# Patient Record
Sex: FEMALE | Race: WHITE | Employment: PART TIME | URBAN - METROPOLITAN AREA
[De-identification: names, ages, dates, MRNs, and addresses within clinical notes are randomized per-mention and may not be internally consistent; named-entity substitution may affect disease eponyms.]

---

## 2019-11-29 ENCOUNTER — HOSPITAL ENCOUNTER (EMERGENCY)
Age: 24
Discharge: HOME | End: 2019-11-29
Attending: EMERGENCY MEDICINE | Admitting: EMERGENCY MEDICINE
Payer: COMMERCIAL

## 2019-11-29 VITALS
WEIGHT: 214.51 LBS | DIASTOLIC BLOOD PRESSURE: 96 MMHG | HEART RATE: 98 BPM | OXYGEN SATURATION: 100 % | RESPIRATION RATE: 16 BRPM | HEIGHT: 66 IN | BODY MASS INDEX: 34.47 KG/M2 | TEMPERATURE: 98.1 F | SYSTOLIC BLOOD PRESSURE: 138 MMHG

## 2019-11-29 DIAGNOSIS — R10.9 SIDE PAIN: Primary | ICD-10-CM

## 2019-11-29 LAB
ALBUMIN SERPL-MCNC: 3.5 G/DL (ref 3.5–5)
ALBUMIN/GLOB SERPL: 0.9 {RATIO} (ref 1.1–2.2)
ALP SERPL-CCNC: 54 U/L (ref 45–117)
ALT SERPL-CCNC: 23 U/L (ref 12–78)
ANION GAP SERPL CALC-SCNC: 10 MMOL/L (ref 5–15)
APPEARANCE UR: CLEAR
AST SERPL-CCNC: 19 U/L (ref 15–37)
BACTERIA URNS QL MICRO: ABNORMAL /HPF
BASOPHILS # BLD: 0 K/UL (ref 0–0.1)
BASOPHILS NFR BLD: 1 % (ref 0–1)
BILIRUB SERPL-MCNC: 0.3 MG/DL (ref 0.2–1)
BILIRUB UR QL: NEGATIVE
BUN SERPL-MCNC: 9 MG/DL (ref 6–20)
BUN/CREAT SERPL: 11 (ref 12–20)
CALCIUM SERPL-MCNC: 9.2 MG/DL (ref 8.5–10.1)
CHLORIDE SERPL-SCNC: 108 MMOL/L (ref 97–108)
CO2 SERPL-SCNC: 24 MMOL/L (ref 21–32)
COLOR UR: ABNORMAL
CREAT SERPL-MCNC: 0.84 MG/DL (ref 0.55–1.02)
D DIMER PPP FEU-MCNC: 0.25 MG/L FEU (ref 0–0.65)
DIFFERENTIAL METHOD BLD: ABNORMAL
EOSINOPHIL # BLD: 0.5 K/UL (ref 0–0.4)
EOSINOPHIL NFR BLD: 6 % (ref 0–7)
EPITH CASTS URNS QL MICRO: ABNORMAL /LPF
ERYTHROCYTE [DISTWIDTH] IN BLOOD BY AUTOMATED COUNT: 13 % (ref 11.5–14.5)
GLOBULIN SER CALC-MCNC: 4 G/DL (ref 2–4)
GLUCOSE SERPL-MCNC: 117 MG/DL (ref 65–100)
GLUCOSE UR STRIP.AUTO-MCNC: NEGATIVE MG/DL
GRAN CASTS URNS QL MICRO: ABNORMAL /LPF
HCG UR QL: NEGATIVE
HCT VFR BLD AUTO: 42.6 % (ref 35–47)
HGB BLD-MCNC: 13.8 G/DL (ref 11.5–16)
HGB UR QL STRIP: NEGATIVE
IMM GRANULOCYTES # BLD AUTO: 0 K/UL (ref 0–0.04)
IMM GRANULOCYTES NFR BLD AUTO: 0 % (ref 0–0.5)
KETONES UR QL STRIP.AUTO: NEGATIVE MG/DL
LEUKOCYTE ESTERASE UR QL STRIP.AUTO: NEGATIVE
LYMPHOCYTES # BLD: 2.9 K/UL (ref 0.8–3.5)
LYMPHOCYTES NFR BLD: 34 % (ref 12–49)
MCH RBC QN AUTO: 28.9 PG (ref 26–34)
MCHC RBC AUTO-ENTMCNC: 32.4 G/DL (ref 30–36.5)
MCV RBC AUTO: 89.3 FL (ref 80–99)
MONOCYTES # BLD: 0.5 K/UL (ref 0–1)
MONOCYTES NFR BLD: 5 % (ref 5–13)
MUCOUS THREADS URNS QL MICRO: ABNORMAL /LPF
NEUTS SEG # BLD: 4.7 K/UL (ref 1.8–8)
NEUTS SEG NFR BLD: 54 % (ref 32–75)
NITRITE UR QL STRIP.AUTO: NEGATIVE
NRBC # BLD: 0 K/UL (ref 0–0.01)
NRBC BLD-RTO: 0 PER 100 WBC
PH UR STRIP: 6 [PH] (ref 5–8)
PLATELET # BLD AUTO: 283 K/UL (ref 150–400)
PMV BLD AUTO: 10.6 FL (ref 8.9–12.9)
POTASSIUM SERPL-SCNC: 3.6 MMOL/L (ref 3.5–5.1)
PROT SERPL-MCNC: 7.5 G/DL (ref 6.4–8.2)
PROT UR STRIP-MCNC: NEGATIVE MG/DL
RBC # BLD AUTO: 4.77 M/UL (ref 3.8–5.2)
RBC #/AREA URNS HPF: ABNORMAL /HPF (ref 0–5)
SODIUM SERPL-SCNC: 142 MMOL/L (ref 136–145)
SP GR UR REFRACTOMETRY: 1.02 (ref 1–1.03)
TROPONIN I SERPL-MCNC: <0.05 NG/ML
UA: UC IF INDICATED,UAUC: ABNORMAL
UROBILINOGEN UR QL STRIP.AUTO: 0.2 EU/DL (ref 0.2–1)
WBC # BLD AUTO: 8.6 K/UL (ref 3.6–11)
WBC URNS QL MICRO: ABNORMAL /HPF (ref 0–4)

## 2019-11-29 PROCEDURE — 85379 FIBRIN DEGRADATION QUANT: CPT

## 2019-11-29 PROCEDURE — 74011250636 HC RX REV CODE- 250/636: Performed by: EMERGENCY MEDICINE

## 2019-11-29 PROCEDURE — 87077 CULTURE AEROBIC IDENTIFY: CPT

## 2019-11-29 PROCEDURE — 84484 ASSAY OF TROPONIN QUANT: CPT

## 2019-11-29 PROCEDURE — 87186 SC STD MICRODIL/AGAR DIL: CPT

## 2019-11-29 PROCEDURE — 99283 EMERGENCY DEPT VISIT LOW MDM: CPT

## 2019-11-29 PROCEDURE — 81025 URINE PREGNANCY TEST: CPT

## 2019-11-29 PROCEDURE — 96374 THER/PROPH/DIAG INJ IV PUSH: CPT

## 2019-11-29 PROCEDURE — 85025 COMPLETE CBC W/AUTO DIFF WBC: CPT

## 2019-11-29 PROCEDURE — 81001 URINALYSIS AUTO W/SCOPE: CPT

## 2019-11-29 PROCEDURE — 87086 URINE CULTURE/COLONY COUNT: CPT

## 2019-11-29 PROCEDURE — 80053 COMPREHEN METABOLIC PANEL: CPT

## 2019-11-29 RX ORDER — KETOROLAC TROMETHAMINE 30 MG/ML
15 INJECTION, SOLUTION INTRAMUSCULAR; INTRAVENOUS
Status: COMPLETED | OUTPATIENT
Start: 2019-11-29 | End: 2019-11-29

## 2019-11-29 RX ADMIN — KETOROLAC TROMETHAMINE 15 MG: 30 INJECTION, SOLUTION INTRAMUSCULAR at 20:10

## 2019-11-30 NOTE — DISCHARGE INSTRUCTIONS

## 2019-11-30 NOTE — ED NOTES
I have reviewed discharge instructions with the patient. The patient verbalized understanding. The patient ambulated out of the department in no distress. VSS.

## 2019-11-30 NOTE — ED NOTES
Pt presents c/o waking up with right side pain and shoulder pain. [facility] and told [facility]up was negative, so she was sent here for further workup.

## 2019-11-30 NOTE — ED PROVIDER NOTES
EMERGENCY DEPARTMENT HISTORY AND PHYSICAL EXAM      Date: 11/29/2019  Patient Name: Cristhian Drake    History of Presenting Illness     Chief Complaint   Patient presents with    Side Pain     right side pain since 0230. reports negative workup at [facility]. sent to ED for further evaluation. History Provided By: Patient    HPI: Cristhian Drake, 30 y.o. female with PMHx significant for nothing, who presents with a chief complaint of right side pain. Patient states she woke up this morning around 2:30 AM with some pain in her right side and right shoulder. She notes that the pain seems to be more pronounced when she takes a deep breath. She denies any associated chest pain, nausea, vomiting, diarrhea, urinary symptoms. She has not tried any medications at home for symptoms. She denies any history of abdominal surgeries. No history of DVT or PE. No recent travel, surgery, hospital stay. She is on a NuvaRing for birth control. [facility] and was sent to the emergency department for additional evaluation    PCP: None    There are no other complaints, changes, or physical findings at this time. Current Outpatient Medications   Medication Sig Dispense Refill    ibuprofen (MOTRIN) 600 mg tablet Take 1 Tab by mouth every eight (8) hours as needed for Pain. 30 Tab 0    oxyCODONE-acetaminophen (PERCOCET) 5-325 mg per tablet Take 1 Tab by mouth every four (4) hours as needed for Pain. Max Daily Amount: 6 Tabs. 12 Tab 0     Past History     Past Medical History:  History reviewed. No pertinent past medical history. Past Surgical History:  History reviewed. No pertinent surgical history. Family History:  History reviewed. No pertinent family history.   Social History:  Social History     Tobacco Use    Smoking status: Never Smoker   Substance Use Topics    Alcohol use: No    Drug use: No     Allergies:  No Known Allergies  Review of Systems   Review of Systems   Constitutional: Negative for chills and fever. HENT: Negative for congestion, rhinorrhea and sore throat. Respiratory: Negative for cough and shortness of breath. Cardiovascular: Negative for chest pain. Gastrointestinal: Negative for abdominal pain, nausea and vomiting.        + right side pain   Genitourinary: Negative for dysuria and urgency. Skin: Negative for rash. Neurological: Negative for dizziness, light-headedness and headaches. All other systems reviewed and are negative. Physical Exam   Physical Exam  Vitals signs and nursing note reviewed. Constitutional:       General: She is not in acute distress. Appearance: She is well-developed. HENT:      Head: Normocephalic and atraumatic. Eyes:      Conjunctiva/sclera: Conjunctivae normal.      Pupils: Pupils are equal, round, and reactive to light. Neck:      Musculoskeletal: Normal range of motion. Cardiovascular:      Rate and Rhythm: Normal rate and regular rhythm. Pulmonary:      Effort: Pulmonary effort is normal. No respiratory distress. Breath sounds: Normal breath sounds. No stridor. Abdominal:      General: There is no distension. Palpations: Abdomen is soft. Tenderness: There is tenderness. There is no right CVA tenderness or left CVA tenderness. Comments: Very mild tenderness in the right upper quadrant with no rebound or guarding   Musculoskeletal: Normal range of motion. Skin:     General: Skin is warm and dry. Neurological:      Mental Status: She is alert and oriented to person, place, and time.        Diagnostic Study Results   Labs -     Recent Results (from the past 12 hour(s))   CBC WITH AUTOMATED DIFF    Collection Time: 11/29/19  8:05 PM   Result Value Ref Range    WBC 8.6 3.6 - 11.0 K/uL    RBC 4.77 3.80 - 5.20 M/uL    HGB 13.8 11.5 - 16.0 g/dL    HCT 42.6 35.0 - 47.0 %    MCV 89.3 80.0 - 99.0 FL    MCH 28.9 26.0 - 34.0 PG    MCHC 32.4 30.0 - 36.5 g/dL    RDW 13.0 11.5 - 14.5 %    PLATELET 438 505 - 853 K/uL    MPV 10.6 8.9 - 12.9 FL    NRBC 0.0 0  WBC    ABSOLUTE NRBC 0.00 0.00 - 0.01 K/uL    NEUTROPHILS 54 32 - 75 %    LYMPHOCYTES 34 12 - 49 %    MONOCYTES 5 5 - 13 %    EOSINOPHILS 6 0 - 7 %    BASOPHILS 1 0 - 1 %    IMMATURE GRANULOCYTES 0 0.0 - 0.5 %    ABS. NEUTROPHILS 4.7 1.8 - 8.0 K/UL    ABS. LYMPHOCYTES 2.9 0.8 - 3.5 K/UL    ABS. MONOCYTES 0.5 0.0 - 1.0 K/UL    ABS. EOSINOPHILS 0.5 (H) 0.0 - 0.4 K/UL    ABS. BASOPHILS 0.0 0.0 - 0.1 K/UL    ABS. IMM. GRANS. 0.0 0.00 - 0.04 K/UL    DF AUTOMATED     METABOLIC PANEL, COMPREHENSIVE    Collection Time: 11/29/19  8:05 PM   Result Value Ref Range    Sodium 142 136 - 145 mmol/L    Potassium 3.6 3.5 - 5.1 mmol/L    Chloride 108 97 - 108 mmol/L    CO2 24 21 - 32 mmol/L    Anion gap 10 5 - 15 mmol/L    Glucose 117 (H) 65 - 100 mg/dL    BUN 9 6 - 20 MG/DL    Creatinine 0.84 0.55 - 1.02 MG/DL    BUN/Creatinine ratio 11 (L) 12 - 20      GFR est AA >60 >60 ml/min/1.73m2    GFR est non-AA >60 >60 ml/min/1.73m2    Calcium 9.2 8.5 - 10.1 MG/DL    Bilirubin, total 0.3 0.2 - 1.0 MG/DL    ALT (SGPT) 23 12 - 78 U/L    AST (SGOT) 19 15 - 37 U/L    Alk.  phosphatase 54 45 - 117 U/L    Protein, total 7.5 6.4 - 8.2 g/dL    Albumin 3.5 3.5 - 5.0 g/dL    Globulin 4.0 2.0 - 4.0 g/dL    A-G Ratio 0.9 (L) 1.1 - 2.2     D DIMER    Collection Time: 11/29/19  8:05 PM   Result Value Ref Range    D-dimer 0.25 0.00 - 0.65 mg/L FEU   TROPONIN I    Collection Time: 11/29/19  8:05 PM   Result Value Ref Range    Troponin-I, Qt. <0.05 <0.05 ng/mL   URINALYSIS W/ REFLEX CULTURE    Collection Time: 11/29/19  8:23 PM   Result Value Ref Range    Color YELLOW/STRAW      Appearance CLEAR CLEAR      Specific gravity 1.016 1.003 - 1.030      pH (UA) 6.0 5.0 - 8.0      Protein NEGATIVE  NEG mg/dL    Glucose NEGATIVE  NEG mg/dL    Ketone NEGATIVE  NEG mg/dL    Bilirubin NEGATIVE  NEG      Blood NEGATIVE  NEG      Urobilinogen 0.2 0.2 - 1.0 EU/dL    Nitrites NEGATIVE NEG      Leukocyte Esterase NEGATIVE  NEG      WBC 0-4 0 - 4 /hpf    RBC 0-5 0 - 5 /hpf    Epithelial cells FEW FEW /lpf    Bacteria 3+ (A) NEG /hpf    UA:UC IF INDICATED URINE CULTURE ORDERED (A) CNI      Mucus TRACE (A) NEG /lpf    Granular cast 0-2 (A) NEG /lpf   HCG URINE, QL. - POC    Collection Time: 11/29/19  8:24 PM   Result Value Ref Range    Pregnancy test,urine (POC) NEGATIVE  NEG         Radiologic Studies -   No orders to display     No results found. Medical Decision Making   I am the first provider for this patient. I reviewed the vital signs, available nursing notes, past medical history, past surgical history, family history and social history. Vital Signs-Reviewed the patient's vital signs. Patient Vitals for the past 12 hrs:   Temp Pulse Resp BP SpO2   11/29/19 2031  98 16 (!) 138/96 100 %   11/29/19 1712 98.1 °F (36.7 °C) 96 16 (!) 137/97 100 %       Pulse Oximetry Analysis - 100% on RA    Records Reviewed: Nursing Notes and Old Medical Records    Provider Notes (Medical Decision Making):   Patient presents with a chief complaint of right side pain. Has only mild tenderness with palpation to the right upper quadrant of her abdomen but no rebound or guarding. On exam, she is well-appearing with stable vital signs. She is not hypoxic on room air and is able to speak in complete sentences without difficulty. Differential includes musculoskeletal pain, cholelithiasis, doubt cholecystitis, doubt PE. However given location of symptoms we will check basic labs, d-dimer, troponin, urinalysis. Given overall benign exam we will hold off on imaging for now unless labs are abnormal.    I was able to review [facility] which patient brought on a disc. Chest x-ray with no focal infiltrate per my read. ED Course:   Initial assessment performed.  The patients presenting problems have been discussed, and they are in agreement with the care plan formulated and outlined with them.  I have encouraged them to ask questions as they arise throughout their visit. Lab work unremarkable. Discussed the option of imaging with the patient, however given normal labs I do not feel is necessary at this time. Patient agrees and states she will treat the pain at home and if things worsen she will return to the emergency department for evaluation. Urine did have bacteria, however no other signs of UTI. Patient again denies any urinary symptoms so we will not treat with antibiotics at this time. Patient is stable for discharge with outpatient follow-up. Return precautions discussed. Critical Care:  none    Disposition:  Discharge Note:    The patient has been re-evaluated and is ready for discharge. Reviewed available results with patient. Counseled patient on diagnosis and care plan. Patient has expressed understanding, and all questions have been answered. Patient agrees with plan and agrees to follow up as recommended, or to return to the ED if their symptoms worsen. Discharge instructions have been provided and explained to the patient, along with reasons to return to the ED. PLAN:  1. Discharge Medication List as of 11/29/2019  9:21 PM        2. Follow-up Information     Follow up With Specialties Details Why Contact Info    your PCP  Schedule an appointment as soon as possible for a visit      [facility] EMERGENCY DEPT Emergency Medicine  As needed, If symptoms worsen [address]  [address] [facility]  [phone]        Return to ED if worse     Diagnosis     Clinical Impression:   1. Side pain        This note will not be viewable in [employer]. Please note that this dictation was completed with [employer], the computer voice recognition software. Quite often unanticipated grammatical, syntax, homophones, and other interpretive errors are inadvertently transcribed by the computer software. Please disregard these errors.   Please excuse any errors that have escaped final proofreading

## 2019-12-02 LAB
BACTERIA SPEC CULT: ABNORMAL
BACTERIA SPEC CULT: ABNORMAL
CC UR VC: ABNORMAL
SERVICE CMNT-IMP: ABNORMAL

## 2019-12-02 NOTE — PROGRESS NOTES
Attempted to call pt to discuss results. Pt evaluated for side pain. Neg leukocytes and nitrites. If pt symptomatic, would treat. Provided callback number.

## 2019-12-02 NOTE — PROGRESS NOTES
Called patient. Pt denies any dysuria, hematuria, urgency, frequency, fever. No further treatment indicated at this point in time.

## 2021-03-18 ENCOUNTER — DOCUMENTATION ONLY (OUTPATIENT)
Dept: NEUROSURGERY | Age: 26
End: 2021-03-18

## 2021-03-18 NOTE — PROGRESS NOTES
Note from patient's visit with ophthalmologist Dr. James Kessler on 3/17/2021 was faxed to our office. In summary, the note reports that the patient is experiencing worsening headaches and new onset pulsatile tinnitus since the fall 2020. There is no disc edema on her ophthalmologic exam.  Her only medication is the NuvaRing. She reports weight fluctuation, with current weight at 225 pounds. Concern is for idiopathic intracranial hypertension with associated pulsatile tinnitus but without papilledema. I will see this patient in clinic as soon as possible. Note will be scanned into media.

## 2021-04-20 ENCOUNTER — OFFICE VISIT (OUTPATIENT)
Dept: NEUROSURGERY | Age: 26
End: 2021-04-20
Payer: COMMERCIAL

## 2021-04-20 VITALS
HEIGHT: 66 IN | HEART RATE: 88 BPM | SYSTOLIC BLOOD PRESSURE: 118 MMHG | BODY MASS INDEX: 36.32 KG/M2 | TEMPERATURE: 97.5 F | DIASTOLIC BLOOD PRESSURE: 78 MMHG | OXYGEN SATURATION: 99 % | WEIGHT: 226 LBS

## 2021-04-20 DIAGNOSIS — H93.A3 PULSATILE TINNITUS OF BOTH EARS: Primary | ICD-10-CM

## 2021-04-20 PROCEDURE — 99214 OFFICE O/P EST MOD 30 MIN: CPT | Performed by: RADIOLOGY

## 2021-04-20 RX ORDER — BISMUTH SUBSALICYLATE 262 MG
1 TABLET,CHEWABLE ORAL DAILY
COMMUNITY

## 2021-04-20 RX ORDER — CALCIUM CARBONATE 300MG(750)
400 TABLET,CHEWABLE ORAL DAILY
COMMUNITY

## 2021-04-20 RX ORDER — ETONOGESTREL AND ETHINYL ESTRADIOL 11.7; 2.7 MG/1; MG/1
INSERT, EXTENDED RELEASE VAGINAL
COMMUNITY

## 2021-04-20 NOTE — PROGRESS NOTES
New patient referred by Dr Robbi Howard presenting with Bilateral Pulsatile Tinnitus. Reports pulsing in both ears, headaches and positional dizziness. Reports symptoms started October 2020. Reports migraine/headache 4-5 time per week with frequent pulsing in ears. Headache usually on the top left side of her head. Currently reports due to diet change and taking vitamins her headaches/migraines are 1 episode every 2 weeks and pulsing in ears one episode in past month.

## 2021-04-20 NOTE — PATIENT INSTRUCTIONS
A Healthy Lifestyle: Care Instructions Your Care Instructions A healthy lifestyle can help you feel good, stay at a healthy weight, and have plenty of energy for both work and play. A healthy lifestyle is something you can share with your whole family. A healthy lifestyle also can lower your risk for serious health problems, such as high blood pressure, heart disease, and diabetes. You can follow a few steps listed below to improve your health and the health of your family. Follow-up care is a key part of your treatment and safety. Be sure to make and go to all appointments, and call your doctor if you are having problems. It's also a good idea to know your test results and keep a list of the medicines you take. How can you care for yourself at home? · Do not eat too much sugar, fat, or fast foods. You can still have dessert and treats now and then. The goal is moderation. · Start small to improve your eating habits. Pay attention to portion sizes, drink less juice and soda pop, and eat more fruits and vegetables. ? Eat a healthy amount of food. A 3-ounce serving of meat, for example, is about the size of a deck of cards. Fill the rest of your plate with vegetables and whole grains. ? Limit the amount of soda and sports drinks you have every day. Drink more water when you are thirsty. ? Eat plenty of fruits and vegetables every day. Have an apple or some carrot sticks as an afternoon snack instead of a candy bar. Try to have fruits and/or vegetables at every meal. 
· Make exercise part of your daily routine. You may want to start with simple activities, such as walking, bicycling, or slow swimming. Try to be active 30 to 60 minutes every day. You do not need to do all 30 to 60 minutes all at once. For example, you can exercise 3 times a day for 10 or 20 minutes.  Moderate exercise is safe for most people, but it is always a good idea to talk to your doctor before starting an exercise program. 
· Keep moving. Iveth Reina the lawn, work in the garden, or [employer]. Take the stairs instead of the elevator at work. · If you smoke, quit. People who smoke have an increased risk for heart attack, stroke, cancer, and other lung illnesses. Quitting is hard, but there are ways to boost your chance of quitting tobacco for good. ? Use nicotine gum, patches, or lozenges. ? Ask your doctor about stop-smoking programs and medicines. ? Keep trying. In addition to reducing your risk of diseases in the future, you will notice some benefits soon after you stop using tobacco. If you have shortness of breath or asthma symptoms, they will likely get better within a few weeks after you quit. · Limit how much alcohol you drink. Moderate amounts of alcohol (up to 2 drinks a day for men, 1 drink a day for women) are okay. But drinking too much can lead to liver problems, high blood pressure, and other health problems. Family health If you have a family, there are many things you can do together to improve your health. · Eat meals together as a family as often as possible. · Eat healthy foods. This includes fruits, vegetables, lean meats and dairy, and whole grains. · Include your family in your fitness plan. Most people think of activities such as jogging or tennis as the way to fitness, but there are many ways you and your family can be more active. Anything that makes you breathe hard and gets your heart pumping is exercise. Here are some tips: 
? Walk to do errands or to take your child to school or the bus. 
? Go for a family bike ride after dinner instead of watching TV. Where can you learn more? Go to http://www.gray.com/ Enter H067 in the search box to learn more about \"A Healthy Lifestyle: Care Instructions. \" Current as of: September 23, 2020               Content Version: 12.8 © 2738-8863 [employer].   
Care instructions adapted under license by [employer] (which disclaims liability or warranty for this information). If you have questions about a medical condition or this instruction, always ask your healthcare professional. [address] any warranty or liability for your use of this information.

## 2021-04-20 NOTE — PROGRESS NOTES
Neurointerventional Surgery Clinic Note    Patient: Candida Santos MRN: [id number]  SSN: [id number]    YOB: 1995  Age: 30 y.o. Sex: female      Subjective:      Candida Santos is a 30 y.o. female with headaches and pulsatile tinnitus who is referred by Dr. Zuri Hardy with concern for idiopathic intracranial hypertension. Patient was seen by Dr. Zuri Hardy on 3/17/2021. There was no evidence of papilledema on her exam.    Patient has a history of migraine headaches with visual aura since she was young. The migraines occurred approximately once a month and were in sync with her menstrual cycle. The last migraine headache that she experienced was in May 2020. In around October 2020, patient started experiencing severe headaches. These headaches occurred 4 to 5 times per week and lasted the whole day. The headaches were 7-8 out of 10 in severity and characterized by a stabbing pain at the left top of her head which spread to the back of her left ear. There was no associated visual aura. Now, the headaches only occur once a week, only last 1 hour, and are less severe (approximately 5 out of 10 in severity). She states that they mostly occur in the mornings and when she stretches. Alcohol is a trigger. Ibuprofen helps. Around the same time of the onset of these headaches, patient started experiencing a pulsing sound in her ears. Upon further questioning, she states that it is a \"whooshing\" sound that is synchronous with her pulse. She also states that at times it was synchronous with sounds she was hearing. She hears a sound in both of her ears and it is not more prominent in 1 year versus the other. This pulsatile tinnitus has also become less frequent and less severe since October. She was not currently experiencing the sound during the clinic visit. The sound could not be elicited with head turning, bending forward, or performing Valsalva.   During the clinic visit, she also started hearing a high-pitched sound in her left ear which was not pulsatile. She states that over the course of 2020 prior to October, she had gained weight. Since October patient has made some diet changes and has improved her stress management. She attributes the improvement of the headaches and pulsatile tinnitus to these changes. Patient denies weakness, numbness and tingling, nausea and vomiting, vision changes, and speech changes. She states that when her headache is really bad she can occasionally feel a little off balance. She has occasional dizziness, mostly when working out. She also occasionally experiences tunnel vision while driving in the car. Patient denies tobacco use. No past medical history on file. No past surgical history on file. Family History   Problem Relation Age of Onset    Headache Mother     Migraines Mother     Heart Disease Father      Social History     Tobacco Use    Smoking status: Never Smoker    Smokeless tobacco: Never Used   Substance Use Topics    Alcohol use: Not on file      Current Outpatient Medications   Medication Sig Dispense Refill    ethinyl estradiol-etonogestrel (NuvaRing) 0.12-0.015 mg/24 hr vaginal ring NuvaRing 0.12 mg-0.015 mg/24 hr vaginal   INSERT 1 VAGINALLY FOR 3 WEEKS THEN REMOVE FOR 1 WEEK. THEN REPEAT      Lactobacillus acidophilus (PROBIOTIC PO) Take  by mouth daily.  magnesium oxide 400 mg magnesium tab Take 400 mg by mouth daily.  multivitamin (ONE A DAY) tablet Take 1 Tab by mouth daily.  OTHER Reports OTC vitamin- does not know the name.  ibuprofen (MOTRIN) 600 mg tablet Take 1 Tab by mouth every eight (8) hours as needed for Pain. 30 Tab 0    oxyCODONE-acetaminophen (PERCOCET) 5-325 mg per tablet Take 1 Tab by mouth every four (4) hours as needed for Pain. Max Daily Amount: 6 Tabs. 12 Tab 0        No Known Allergies    Review of Systems:  Pertinent items are noted in the History of Present Illness.     Objective: Vitals:    04/20/21 1025   BP: 118/78   Pulse: 88   Temp: 97.5 °F (36.4 °C)   TempSrc: Temporal   SpO2: 99%   Weight: 226 lb (102.5 kg)   Height: 5' 6\" (1.676 m)        Physical Exam:  GENERAL: alert, cooperative, no distress, appears stated age  EYE: negative  HEAD & NECK: no periauricular bruits or thrills; no carotid bruit  LUNG: clear to auscultation bilaterally  HEART: regular rate and rhythm, S1, S2 normal, no murmur, click, rub or gallop  EXTREMITIES:  extremities normal, atraumatic, no cyanosis or edema    Neurologic Exam:  Mental Status:  Alert and oriented x 4. Appropriate affect, mood and behavior. Language:    Normal fluency, repetition, comprehension and naming. Cranial Nerves:   Pupils equal, round and reactive to light. Visual fields full to confrontation. Extraocular movements intact. Facial sensation intact V1 - V3. Full facial strength, no asymmetry. Hearing intact bilaterally. No dysarthria. Tongue protrudes to midline, palate elevates symmetrically. Shoulder shrug 5/5 bilaterally. Motor:    No pronator drift. Bulk and tone normal.      5/5 power in all extremities proximally and distally. No involuntary movements. Sensation:    Sensation intact throughout to light touch    Reflexes:    Deferred    Coordination & Gait: Normal      Imaging:  No related imaging. Assessment:   30 y.o. female with history of migraine headaches who experienced onset of different, severe headaches and pulsatile tinnitus in October 2020 and is referred by Dr. Demetrio Chacko with concern for idiopathic intracranial hypertension. There was no evidence of papilledema on her ophthalmologic exam.  Patient has made some diet and stress management changes. She states that her headaches and pulsatile tinnitus have improved. She is neurologically intact on my exam.  I cannot elicit the pulsatile tinnitus with patient head turning, bending forward, or performing Valsalva. There is no objective evidence of pulsatile tinnitus in the periauricular regions. We discussed the natural history of intracranial hypertension. We also discussed the potential etiologies of pulsatile tinnitus. Since she does not have papilledema and since her symptoms have significantly improved over the past 6 months, I suspect that continued weight loss would continue to improve her symptoms and hopefully medical or interventional management will not be necessary. We discussed the importance of weight loss. We also discussed the medical treatment options in Diamox and Topamax, as well as the interventional treatment options in cerebral venous sinus stenting. I explained to her that the interventional option is a last resort treatment for intracranial hypertension, but can also be considered in pulsatile tinnitus that significantly affects quality of life. We will obtain an MRI brain without contrast, MRA brain without contrast, and MRV with and without contrast for further evaluation. I will see the patient back in clinic in 1 month, after the studies have been obtained. If there are any secondary signs of intracranial hypertension or venous sinus stenosis, we will consider performing a lumbar puncture. Regardless, once the imaging is obtained, I will likely refer patient to neurology for management of the headaches and/or intracranial hypertension. I advised the patient to continue to lose weight. We discussed the ketogenic diet and importance of exercise. Patient seems motivated. I also advised her to seek emergent medical care if she experiences the worst headache of her life, new neurological changes, or worsening symptoms. Patient expressed understanding and is in agreement with this plan.       Plan:     -MRI brain, MRA brain without contrast, MRV brain with and without contrast  -Return to clinic in 1 month to discuss next steps    Thank you for allowing me to participate in the care of this patient. Greater than 45 minutes were spent in patient management, greater than half of which was spent in counseling and coordination of care.         Signed By: Singh Jacinto MD     April 20, 2021

## 2021-04-20 NOTE — LETTER
4/20/2021 Patient: Juve Lemus YOB: 1995 Date of Visit: 4/20/2021 Gaston Guajardo NP 
[address] [facility] [place] [address] Via Fax: [phone] Faustina Jose MD 
[address] [facility] [address] Via Fax: [phone] Dear HARJEET Colunga MD, Thank you for referring Ms. Juve Lemus to [address] [place] [address] for evaluation. My notes for this consultation are attached. If you have questions, please do not hesitate to call me. I look forward to following your patient along with you.  
 
 
Sincerely, 
 
Олег Finch MD

## 2021-04-22 DIAGNOSIS — H93.A3 PULSATILE TINNITUS OF BOTH EARS: Primary | ICD-10-CM

## 2021-05-17 ENCOUNTER — HOSPITAL ENCOUNTER (OUTPATIENT)
Dept: MRI IMAGING | Age: 26
Discharge: HOME | End: 2021-05-17
Attending: RADIOLOGY
Payer: COMMERCIAL

## 2021-05-17 DIAGNOSIS — H93.A3 PULSATILE TINNITUS OF BOTH EARS: ICD-10-CM

## 2021-05-17 PROCEDURE — 70544 MR ANGIOGRAPHY HEAD W/O DYE: CPT

## 2021-05-17 PROCEDURE — 70551 MRI BRAIN STEM W/O DYE: CPT

## 2021-05-24 ENCOUNTER — OFFICE VISIT (OUTPATIENT)
Dept: NEUROSURGERY | Age: 26
End: 2021-05-24
Payer: COMMERCIAL

## 2021-05-24 VITALS
DIASTOLIC BLOOD PRESSURE: 78 MMHG | HEART RATE: 88 BPM | SYSTOLIC BLOOD PRESSURE: 112 MMHG | OXYGEN SATURATION: 97 % | TEMPERATURE: 98.1 F | BODY MASS INDEX: 36.32 KG/M2 | WEIGHT: 226 LBS | HEIGHT: 66 IN

## 2021-05-24 DIAGNOSIS — H93.A3 PULSATILE TINNITUS OF BOTH EARS: Primary | ICD-10-CM

## 2021-05-24 PROCEDURE — 99213 OFFICE O/P EST LOW 20 MIN: CPT | Performed by: RADIOLOGY

## 2021-05-24 NOTE — PATIENT INSTRUCTIONS
A Healthy Lifestyle: Care Instructions Your Care Instructions A healthy lifestyle can help you feel good, stay at a healthy weight, and have plenty of energy for both work and play. A healthy lifestyle is something you can share with your whole family. A healthy lifestyle also can lower your risk for serious health problems, such as high blood pressure, heart disease, and diabetes. You can follow a few steps listed below to improve your health and the health of your family. Follow-up care is a key part of your treatment and safety. Be sure to make and go to all appointments, and call your doctor if you are having problems. It's also a good idea to know your test results and keep a list of the medicines you take. How can you care for yourself at home? · Do not eat too much sugar, fat, or fast foods. You can still have dessert and treats now and then. The goal is moderation. · Start small to improve your eating habits. Pay attention to portion sizes, drink less juice and soda pop, and eat more fruits and vegetables. ? Eat a healthy amount of food. A 3-ounce serving of meat, for example, is about the size of a deck of cards. Fill the rest of your plate with vegetables and whole grains. ? Limit the amount of soda and sports drinks you have every day. Drink more water when you are thirsty. ? Eat plenty of fruits and vegetables every day. Have an apple or some carrot sticks as an afternoon snack instead of a candy bar. Try to have fruits and/or vegetables at every meal. 
· Make exercise part of your daily routine. You may want to start with simple activities, such as walking, bicycling, or slow swimming. Try to be active 30 to 60 minutes every day. You do not need to do all 30 to 60 minutes all at once. For example, you can exercise 3 times a day for 10 or 20 minutes.  Moderate exercise is safe for most people, but it is always a good idea to talk to your doctor before starting an exercise program. 
· Keep moving. [employer] the lawn, work in the garden, or [employer]. Take the stairs instead of the elevator at work. · If you smoke, quit. People who smoke have an increased risk for heart attack, stroke, cancer, and other lung illnesses. Quitting is hard, but there are ways to boost your chance of quitting tobacco for good. ? Use nicotine gum, patches, or lozenges. ? Ask your doctor about stop-smoking programs and medicines. ? Keep trying. In addition to reducing your risk of diseases in the future, you will notice some benefits soon after you stop using tobacco. If you have shortness of breath or asthma symptoms, they will likely get better within a few weeks after you quit. · Limit how much alcohol you drink. Moderate amounts of alcohol (up to 2 drinks a day for men, 1 drink a day for women) are okay. But drinking too much can lead to liver problems, high blood pressure, and other health problems. Family health If you have a family, there are many things you can do together to improve your health. · Eat meals together as a family as often as possible. · Eat healthy foods. This includes fruits, vegetables, lean meats and dairy, and whole grains. · Include your family in your fitness plan. Most people think of activities such as jogging or tennis as the way to fitness, but there are many ways you and your family can be more active. Anything that makes you breathe hard and gets your heart pumping is exercise. Here are some tips: 
? Walk to do errands or to take your child to school or the bus. 
? Go for a family bike ride after dinner instead of watching TV. Where can you learn more? Go to http://www.gray.com/ Enter W134 in the search box to learn more about \"A Healthy Lifestyle: Care Instructions. \" Current as of: September 23, 2020               Content Version: 12.8 © 6243-0885 [employer].   
Care instructions adapted under license by [employer] (which disclaims liability or warranty for this information). If you have questions about a medical condition or this instruction, always ask your healthcare professional. [address] any warranty or liability for your use of this information.

## 2021-05-24 NOTE — PROGRESS NOTES
Follow up for Pulsatile tinnitus and imaging results. Patient reports headaches are triggered by alcohol consumption. Reports frequent headaches since last visit. Reports 3-4 episodes of whooshing sound in right ear since last visit. No acute problems reported.

## 2021-05-24 NOTE — LETTER
5/31/2021 Patient: Anamaria Burden YOB: 1995 Date of Visit: 5/24/2021 [place] 
[address] [facility] [place] [address] Via Fax: [phone] Amanuel Camacho MD 
[address] [place] [address] Via Fax: [phone] Dear Annitta Heimlich, NP Mardy Garfinkel, MD, Thank you for referring Ms. Damaris Viramontes to [address] [place] [address] for evaluation. My notes for this consultation are attached. If you have questions, please do not hesitate to call me. I look forward to following your patient along with you.  
 
 
Sincerely, 
 
Jordin Ta MD

## 2021-05-31 NOTE — PROGRESS NOTES
Neurointerventional Surgery Clinic Note    Patient: Frank Chirinos MRN: [id number]  SSN: [id number]    YOB: 1995  Age: 30 y.o. Sex: female      Subjective:      Frank Chirinos is a 30 y.o. female with headaches and pulsatile tinnitus who was initially referred by Dr. Arie Ty with concern for idiopathic intracranial hypertension. Patient returns for clinical and imaging follow-up. Patient continues to have headaches. She states that the headaches are triggered by alcohol consumption. She still hears a whooshing in her ears, but less frequently. Again, she was not currently experiencing the sound during the clinic visit, and the sound could not be elicited with different maneuvers. Patient denies new neurological changes. No past medical history on file. No past surgical history on file. Family History   Problem Relation Age of Onset    Headache Mother     Migraines Mother     Heart Disease Father      Social History     Tobacco Use    Smoking status: Never Smoker    Smokeless tobacco: Never Used   Substance Use Topics    Alcohol use: Yes     Alcohol/week: 4.0 - 5.0 standard drinks     Types: 4 - 5 Shots of liquor per week      Current Outpatient Medications   Medication Sig Dispense Refill    ethinyl estradiol-etonogestrel (NuvaRing) 0.12-0.015 mg/24 hr vaginal ring NuvaRing 0.12 mg-0.015 mg/24 hr vaginal   INSERT 1 VAGINALLY FOR 3 WEEKS THEN REMOVE FOR 1 WEEK. THEN REPEAT      Lactobacillus acidophilus (PROBIOTIC PO) Take  by mouth daily. (Patient not taking: Reported on 5/24/2021)      magnesium oxide 400 mg magnesium tab Take 400 mg by mouth daily. (Patient not taking: Reported on 5/24/2021)      multivitamin (ONE A DAY) tablet Take 1 Tab by mouth daily. (Patient not taking: Reported on 5/24/2021)      OTHER Reports OTC vitamin- does not know the name.  (Patient not taking: Reported on 5/24/2021)      ibuprofen (MOTRIN) 600 mg tablet Take 1 Tab by mouth every eight (8) hours as needed for Pain. (Patient not taking: Reported on 5/24/2021) 30 Tab 0    oxyCODONE-acetaminophen (PERCOCET) 5-325 mg per tablet Take 1 Tab by mouth every four (4) hours as needed for Pain. Max Daily Amount: 6 Tabs. (Patient not taking: Reported on 5/24/2021) 12 Tab 0        No Known Allergies    Review of Systems:  Pertinent items are noted in the History of Present Illness. Objective:     Vitals:    05/24/21 1337   BP: 112/78   Pulse: 88   Temp: 98.1 °F (36.7 °C)   TempSrc: Temporal   SpO2: 97%   Weight: 226 lb (102.5 kg)   Height: 5' 6\" (1.676 m)        Physical Exam:  GENERAL: alert, cooperative, no distress, appears stated age  EYE: negative  HEAD & NECK: no periauricular bruits or thrills; no carotid bruit  LUNG: clear to auscultation bilaterally  HEART: regular rate and rhythm, S1, S2 normal, no murmur, click, rub or gallop  EXTREMITIES:  extremities normal, atraumatic, no cyanosis or edema    Neurologic Exam:  Mental Status:  Alert and oriented x 4. Appropriate affect, mood and behavior. Language:    Normal fluency, repetition, comprehension and naming. Cranial Nerves:   Pupils equal, round and reactive to light. Visual fields full to confrontation. Extraocular movements intact. Facial sensation intact V1 - V3. Full facial strength, no asymmetry. Hearing intact bilaterally. No dysarthria. Tongue protrudes to midline, palate elevates symmetrically. Shoulder shrug 5/5 bilaterally. Motor:    No pronator drift. Bulk and tone normal.      5/5 power in all extremities proximally and distally. No involuntary movements. Sensation:    Sensation intact throughout to light touch    Reflexes:    Deferred    Coordination & Gait: Normal      Imaging:  I personally reviewed the following imaging studies. The impressions listed below are those of the interpreting radiologist(s). MRI brain 5/17/2021:  1. Normal brain MRI. MRV brain 5/17/2021:  1. Normal MRV head. MRA brain 5/17/2021:  1. Normal MRA head. Assessment:   30 y.o. female with history of migraine headaches who experienced onset of different, severe headaches and pulsatile tinnitus in October 2020 and was referred by Dr. Jesus Alberto Ocasio with concern for idiopathic intracranial hypertension.     There was no evidence of papilledema on Dr. Jenna Ricci ophthalmologic exam.    Patient symptoms are essentially unchanged. She notices an association with alcohol consumption. She remains neurologically intact on my exam.    Again, I cannot elicit the pulsatile tinnitus with different maneuvers. And, there is no objective evidence of pulsatile tinnitus in the periauricular regions. MRI, MRA, and MRV imaging are unremarkable. There is no evidence of venous sinus stenosis. No secondary signs of intracranial hypertension are noted. Patient does not currently have a neurologist.  I will refer her to Neurology. No further Neuro interventional surgery follow-up is indicated, however I advised the patient to schedule appointment with me if the pulsatile tinnitus gets worse or begins to have negative impact on her quality of life. Further work-up with diagnostic cerebral angiography can be performed at that time, if clinically indicated.     We discussed the continued importance of weight loss in case there is an element of intracranial hypertension. I also advised her to seek emergent medical care if she experiences the worst headache of her life, new neurological changes, or worsening symptoms. Patient expressed understanding and is in agreement with this plan. Plan:     -Referral to Neurology for management of headaches and possible intracranial hypertension  -No further NIS follow up needed unless the pulsatile tinnitus gets worse or begins to have negative impact on her quality of life    Thank you for allowing me to participate in the care of this patient.     Greater than 20 minutes were spent in patient management, greater than half of which was spent in counseling and coordination of care.         Signed By: Ellyn Ryder MD     May 31, 2021

## 2022-03-19 PROBLEM — H93.A3 PULSATILE TINNITUS OF BOTH EARS: Status: ACTIVE | Noted: 2021-04-20

## 2023-05-11 RX ORDER — ETONOGESTREL AND ETHINYL ESTRADIOL 11.7; 2.7 MG/1; MG/1
INSERT, EXTENDED RELEASE VAGINAL
COMMUNITY

## 2023-05-11 RX ORDER — OXYCODONE HYDROCHLORIDE AND ACETAMINOPHEN 5; 325 MG/1; MG/1
1 TABLET ORAL EVERY 4 HOURS PRN
COMMUNITY
Start: 2016-07-20

## 2023-05-11 RX ORDER — IBUPROFEN 600 MG/1
TABLET ORAL EVERY 8 HOURS PRN
COMMUNITY
Start: 2016-07-20

## 2023-05-11 RX ORDER — MAGNESIUM OXIDE 400 MG/1
400 TABLET ORAL DAILY
COMMUNITY